# Patient Record
Sex: FEMALE | Race: BLACK OR AFRICAN AMERICAN | NOT HISPANIC OR LATINO | URBAN - METROPOLITAN AREA
[De-identification: names, ages, dates, MRNs, and addresses within clinical notes are randomized per-mention and may not be internally consistent; named-entity substitution may affect disease eponyms.]

---

## 2017-01-05 ENCOUNTER — OUTPATIENT (OUTPATIENT)
Dept: OUTPATIENT SERVICES | Facility: HOSPITAL | Age: 32
LOS: 1 days | Discharge: HOME | End: 2017-01-05
Payer: MEDICAID

## 2017-01-05 DIAGNOSIS — Z88.0 ALLERGY STATUS TO PENICILLIN: ICD-10-CM

## 2017-01-05 DIAGNOSIS — E66.9 OBESITY, UNSPECIFIED: ICD-10-CM

## 2017-01-05 DIAGNOSIS — N62 HYPERTROPHY OF BREAST: ICD-10-CM

## 2017-01-05 DIAGNOSIS — I10 ESSENTIAL (PRIMARY) HYPERTENSION: ICD-10-CM

## 2017-01-05 DIAGNOSIS — K21.9 GASTRO-ESOPHAGEAL REFLUX DISEASE WITHOUT ESOPHAGITIS: ICD-10-CM

## 2017-01-05 DIAGNOSIS — J45.909 UNSPECIFIED ASTHMA, UNCOMPLICATED: ICD-10-CM

## 2017-01-05 PROCEDURE — 19318 BREAST REDUCTION: CPT | Mod: AS

## 2017-01-12 LAB — SURGICAL PATHOLOGY STUDY: SIGNIFICANT CHANGE UP

## 2018-08-02 ENCOUNTER — EMERGENCY (EMERGENCY)
Facility: HOSPITAL | Age: 33
LOS: 1 days | Discharge: HOME | End: 2018-08-02
Attending: EMERGENCY MEDICINE | Admitting: EMERGENCY MEDICINE
Payer: MEDICARE

## 2018-08-02 VITALS
HEART RATE: 84 BPM | SYSTOLIC BLOOD PRESSURE: 134 MMHG | TEMPERATURE: 99 F | RESPIRATION RATE: 16 BRPM | DIASTOLIC BLOOD PRESSURE: 90 MMHG | OXYGEN SATURATION: 100 %

## 2018-08-02 VITALS
TEMPERATURE: 98 F | RESPIRATION RATE: 16 BRPM | DIASTOLIC BLOOD PRESSURE: 83 MMHG | HEART RATE: 74 BPM | SYSTOLIC BLOOD PRESSURE: 118 MMHG | OXYGEN SATURATION: 100 %

## 2018-08-02 DIAGNOSIS — Z90.49 ACQUIRED ABSENCE OF OTHER SPECIFIED PARTS OF DIGESTIVE TRACT: Chronic | ICD-10-CM

## 2018-08-02 DIAGNOSIS — Z98.890 OTHER SPECIFIED POSTPROCEDURAL STATES: Chronic | ICD-10-CM

## 2018-08-02 DIAGNOSIS — Z98.51 TUBAL LIGATION STATUS: Chronic | ICD-10-CM

## 2018-08-02 LAB
ALBUMIN SERPL ELPH-MCNC: 4.5 G/DL — SIGNIFICANT CHANGE UP (ref 3.3–5)
ALP SERPL-CCNC: 36 U/L — LOW (ref 40–120)
ALT FLD-CCNC: 117 U/L — HIGH (ref 4–33)
AST SERPL-CCNC: 73 U/L — HIGH (ref 4–32)
BASOPHILS # BLD AUTO: 0.01 K/UL — SIGNIFICANT CHANGE UP (ref 0–0.2)
BASOPHILS NFR BLD AUTO: 0.2 % — SIGNIFICANT CHANGE UP (ref 0–2)
BILIRUB SERPL-MCNC: 0.4 MG/DL — SIGNIFICANT CHANGE UP (ref 0.2–1.2)
BUN SERPL-MCNC: 9 MG/DL — SIGNIFICANT CHANGE UP (ref 7–23)
CALCIUM SERPL-MCNC: 9.4 MG/DL — SIGNIFICANT CHANGE UP (ref 8.4–10.5)
CHLORIDE SERPL-SCNC: 99 MMOL/L — SIGNIFICANT CHANGE UP (ref 98–107)
CLARITY CSF: CLEAR — SIGNIFICANT CHANGE UP
CO2 SERPL-SCNC: 23 MMOL/L — SIGNIFICANT CHANGE UP (ref 22–31)
COLOR CSF: COLORLESS — SIGNIFICANT CHANGE UP
CREAT SERPL-MCNC: 0.54 MG/DL — SIGNIFICANT CHANGE UP (ref 0.5–1.3)
EOSINOPHIL # BLD AUTO: 0.1 K/UL — SIGNIFICANT CHANGE UP (ref 0–0.5)
EOSINOPHIL NFR BLD AUTO: 2.3 % — SIGNIFICANT CHANGE UP (ref 0–6)
GLUCOSE CSF-MCNC: 60 MG/DL — SIGNIFICANT CHANGE UP (ref 40–70)
GLUCOSE SERPL-MCNC: 86 MG/DL — SIGNIFICANT CHANGE UP (ref 70–99)
GRAM STN CSF: SIGNIFICANT CHANGE UP
HCT VFR BLD CALC: 38.5 % — SIGNIFICANT CHANGE UP (ref 34.5–45)
HGB BLD-MCNC: 12.9 G/DL — SIGNIFICANT CHANGE UP (ref 11.5–15.5)
IMM GRANULOCYTES # BLD AUTO: 0.01 # — SIGNIFICANT CHANGE UP
IMM GRANULOCYTES NFR BLD AUTO: 0.2 % — SIGNIFICANT CHANGE UP (ref 0–1.5)
LYMPHOCYTES # BLD AUTO: 1.94 K/UL — SIGNIFICANT CHANGE UP (ref 1–3.3)
LYMPHOCYTES # BLD AUTO: 44.6 % — HIGH (ref 13–44)
LYMPHOCYTES # CSF: 60 % — SIGNIFICANT CHANGE UP
MCHC RBC-ENTMCNC: 29.5 PG — SIGNIFICANT CHANGE UP (ref 27–34)
MCHC RBC-ENTMCNC: 33.5 % — SIGNIFICANT CHANGE UP (ref 32–36)
MCV RBC AUTO: 88.1 FL — SIGNIFICANT CHANGE UP (ref 80–100)
MONOCYTES # BLD AUTO: 0.28 K/UL — SIGNIFICANT CHANGE UP (ref 0–0.9)
MONOCYTES # CSF: 40 % — SIGNIFICANT CHANGE UP
MONOCYTES NFR BLD AUTO: 6.4 % — SIGNIFICANT CHANGE UP (ref 2–14)
NEUTROPHILS # BLD AUTO: 2.01 K/UL — SIGNIFICANT CHANGE UP (ref 1.8–7.4)
NEUTROPHILS NFR BLD AUTO: 46.3 % — SIGNIFICANT CHANGE UP (ref 43–77)
NEUTS SEG NFR CSF MANUAL: 0 % — SIGNIFICANT CHANGE UP
NRBC # FLD: 0 — SIGNIFICANT CHANGE UP
NRBC NFR CSF: < 1 CELL/UL — SIGNIFICANT CHANGE UP (ref 0–5)
PLATELET # BLD AUTO: 244 K/UL — SIGNIFICANT CHANGE UP (ref 150–400)
PMV BLD: 9.6 FL — SIGNIFICANT CHANGE UP (ref 7–13)
POTASSIUM SERPL-MCNC: 5.2 MMOL/L — SIGNIFICANT CHANGE UP (ref 3.5–5.3)
POTASSIUM SERPL-SCNC: 5.2 MMOL/L — SIGNIFICANT CHANGE UP (ref 3.5–5.3)
PROT CSF-MCNC: 14.5 MG/DL — LOW (ref 15–45)
PROT SERPL-MCNC: 7.7 G/DL — SIGNIFICANT CHANGE UP (ref 6–8.3)
RBC # BLD: 4.37 M/UL — SIGNIFICANT CHANGE UP (ref 3.8–5.2)
RBC # CSF: < 1 CELL/UL — HIGH (ref 0–0)
RBC # FLD: 12.3 % — SIGNIFICANT CHANGE UP (ref 10.3–14.5)
SODIUM SERPL-SCNC: 136 MMOL/L — SIGNIFICANT CHANGE UP (ref 135–145)
SPECIMEN SOURCE: SIGNIFICANT CHANGE UP
TOTAL CELLS COUNTED, SPINAL FLUID: 10 CELLS — SIGNIFICANT CHANGE UP
WBC # BLD: 4.35 K/UL — SIGNIFICANT CHANGE UP (ref 3.8–10.5)
WBC # FLD AUTO: 4.35 K/UL — SIGNIFICANT CHANGE UP (ref 3.8–10.5)
XANTHOCHROMIA: SIGNIFICANT CHANGE UP

## 2018-08-02 PROCEDURE — 99285 EMERGENCY DEPT VISIT HI MDM: CPT | Mod: 25

## 2018-08-02 PROCEDURE — 62270 DX LMBR SPI PNXR: CPT

## 2018-08-02 PROCEDURE — 70450 CT HEAD/BRAIN W/O DYE: CPT | Mod: 26

## 2018-08-02 RX ORDER — ACETAZOLAMIDE 250 MG/1
1 TABLET ORAL
Qty: 28 | Refills: 0 | OUTPATIENT
Start: 2018-08-02 | End: 2018-08-15

## 2018-08-02 RX ORDER — ACETAMINOPHEN 500 MG
650 TABLET ORAL ONCE
Qty: 0 | Refills: 0 | Status: COMPLETED | OUTPATIENT
Start: 2018-08-02 | End: 2018-08-02

## 2018-08-02 RX ADMIN — Medication 650 MILLIGRAM(S): at 13:13

## 2018-08-02 NOTE — SBIRT NOTE. - NSSBIRTFULLSCREEN_GEN_A_ED_FT
Meeting with patient attempted however Full Screen Not Performed due to  Provider at bedside; health  will return by 1430 to complete SBIRT service

## 2018-08-02 NOTE — ED PROVIDER NOTE - CRANIAL NERVE AND PUPILLARY EXAM
tongue is midline sluggish pupillary reflexes B/L, peripheral vision by confrontation diminished, central vision intact. Ipsilateral EOM deficit noted with abduction B/L/cranial nerves 2-12 intact/PERIPHERAL VISION NOT INTACT/tongue is midline/central vision intact

## 2018-08-02 NOTE — ED PROVIDER NOTE - NEUROLOGICAL SENSATION
R sided facial sensory deficits compared to L sided facial sensation present and normal in 4 extremities

## 2018-08-02 NOTE — CONSULT NOTE ADULT - ASSESSMENT
32 y/o F w/ h/o PCOS, IICH presenting w/ headache and peripheral vision loss similar to prior episode of IICH,  consistent with pseudotumor cerebri/IICH.     She is s/p high volume LP in ED with improvement in her symptoms.     Recommendation:   - start Diamox 250 mg BID   - has appt w/ Neurologist on Monday 8/6, advised to ask for CMP to be measured    - advised to return immediately to ED for any worsening symptoms   - no need for Ophthalmology eval   - d/w Dr Mckeon

## 2018-08-02 NOTE — CONSULT NOTE ADULT - NSHPATTENDINGPLANDISCUSS_GEN_ALL_CORE
neurology resident as above and I agree with plan to restart diamox 250mg BID and outpatient neurology follow up next week.

## 2018-08-02 NOTE — ED ADULT NURSE NOTE - OBJECTIVE STATEMENT
Pt rcvd to room 8 from intake c/o HA x 2 weeks accompanied by nausea, peripheral vision loss, lightheadedness when standing, discomfort in vision with "bright light." Denies vomiting, fever, chills, weakness, or any other complaints at this time. Aox3, ambulatory at baseline. Awaiting MD lyman at this time. Will continue to monitor.

## 2018-08-02 NOTE — ED PROVIDER NOTE - PROGRESS NOTE DETAILS
IVANA note: 34yo female h/o pseudotumor prior therapeutic LPs, previously on diamox, stoppped 2 years ago, p/w 2 weeks worsening headaches, neck pain, dizziness, visual changes, feels similar to prior symptoms, unable to get appt with neurologist. n o fevers, no chills, no recent illness, not sudden onset  will get ct head r/o acute intracranial pathology, then diagnostic/therapeutic LP Pt tolerated lumbar puncture well, states she is feeling better, currently lying supine in bed in NAD, VSS, called Neuro and Ophtho for consults. Will continue to obs. Patient sitting in bed comfortably in no acute distress, vital signs are stable. Patient talking in full sentences, patient able to ambulate well in the emergency room with no assistance. Patient is stable to be discharged. Case discussed with Dr. Conner who agrees the patient can be discharged with outpatient follow-up with Neurology and PMD. Patient expresses understanding of the diagnosis and has been told to return to the emergency room if any fevers, chills, chest pain, shortness of breath, new or worsening symptoms. Patient expresses desire for discharge and agrees to follow-up as discussed.

## 2018-08-02 NOTE — ED PROCEDURE NOTE - ATTENDING CONTRIBUTION TO CARE
Opening pressure 35 with clear CSF. 25mL CSF removed and patient reports marked improvement of her symptoms.

## 2018-08-02 NOTE — ED PROVIDER NOTE - ATTENDING CONTRIBUTION TO CARE
34 y/o F PMHx pseudotumor cerebri (dx 2014, hx of therapeutic LPs, last 2015, was on Acetazolamide), PCOS, GERD, pre-DM p/w HA, blurry vision and peripheral vision is diminished, which feels exactly like how her pseduotumor feels like it. Endorses R facial sensory deficits compared to L side, No vertical/bidirectional nystagmus. Visual acuity intact but loss of peripheral fields. Plan- Labs, CTH, therapeutic L.P., neuro consult.

## 2018-08-02 NOTE — ED PROVIDER NOTE - OBJECTIVE STATEMENT
34yo F w/ pmhx of pseudotumor cerebri (diagnosed 2014, hx of LP for therapeutic purposes x 3, last 2015), PCOS, GERD, pre-DM - not on any medications presents to the ED c/o HA, blurred vision, visual changes, lightheadedness which have been worsening x 2 weeks. Pt states she had previously been on Acetazolamide for P.C. as maintenance medication; however, since her last pregnancy in 2015 she has not been on meds and managed her condition with weight reduction. Pt states she feels like her peripheral vision is diminished B/L, reports lightheadedness especially w/ bending down and flexion of her neck,  no episodes of syncope. Pt also reports B/L paresthesias of her arms and fingers which feels like "they are asleep." Pt reports nausea and vomiting since the onset of symptoms, not actively vomiting, mild nausea presently. Pt denies fevers, chills, CP, sob, abd pain, focal weakness, neck stiffness. 32yo F w/ pmhx of pseudotumor cerebri (diagnosed 2014, hx of LP for therapeutic purposes x 3, last 2015), PCOS, GERD, pre-DM - not on any medications presents to the ED c/o HA, blurred vision, visual changes, lightheadedness which have been worsening x 2 weeks. Pt states she had previously been on Acetazolamide for P.C. as maintenance medication; however, since her last pregnancy in 2015 she has not been on meds and managed her condition with weight reduction. Pt states she feels like her peripheral vision is diminished B/L, reports lightheadedness especially w/ bending down and flexion of her neck,  no episodes of syncope. Pt also reports B/L paresthesias of her arms and fingers which feels like "they are asleep." Pt reports nausea and vomiting since the onset of symptoms, not actively vomiting, mild nausea presently. Pt denies fevers, chills, CP, sob, abd pain, focal weakness, neck stiffness.  PMD= Dr. Tracey Gavin  Neuro/Ophthal= Dr. Angel Finney 32yo F w/ pmhx of pseudotumor cerebri (diagnosed 2014, hx of LP for therapeutic purposes x 3, last 2015), PCOS, GERD, pre-DM - not on any medications presents to the ED c/o HA, blurred vision, visual changes, lightheadedness which have been worsening x 2 weeks. Pt states she had previously been on Acetazolamide for P.C. as maintenance medication; however, since her last pregnancy in 2015 she has not been on meds and managed her condition with weight reduction. Pt states she feels like her peripheral vision is diminished B/L, reports lightheadedness especially w/ bending down and flexion of her neck,  no episodes of syncope. Pt also reports B/L paresthesias of her arms and fingers which feels like "they are asleep." Pt reports nausea and vomiting since the onset of symptoms, not actively vomiting, mild nausea presently. Pt denies fevers, chills, CP, sob, abd pain, focal weakness, neck stiffness.  PMD= Dr. Tracey Gavin  Neuro/Ophthal= Dr. Angel Finney- pt called to make appt but was unable to obtain until Dec.

## 2018-08-02 NOTE — CONSULT NOTE ADULT - SUBJECTIVE AND OBJECTIVE BOX
NEUROLOGY CONSULT     Patient is a 33y old  Female who presents with a chief complaint of headache, blurry vision     HPI: 32 y/o AA overweight F w/ h/o PCOS, IICH in past, previously on Diamox presenting w/ HA and blurry vision x2 weeks, acutely worsening the past 2 days. HA initially dull, pressure like, bothersome and intermittent, progressed to constant headache and pressure behind eyes, w/ peripheral darkening of vision.   Prior History of IICH: Diagnosed 2013, s/p therapeutic LPs x3, on Diamox stopped 2014 after symptoms stable. Was following w/ Neuro ophthalmologist at [facility].   Had therapeutic LP in ED- opening pressure 33. 30 cc drained. Feels symptoms have improved, in terms of headache which is less painful, less pressure behind eyes and vision slightly blurry but otherwise no peripheral vision loss.       PAST MEDICAL & SURGICAL HISTORY:  GERD (gastroesophageal reflux disease)  PCOS (polycystic ovarian syndrome)  Pseudotumor cerebri  History of cholecystectomy  History of loop recorder  S/P bilateral breast reduction  H/O tubal ligation      Allergies    MSG (Hives)  penicillins (Anaphylaxis)  Reglan (Other)    Intolerances        MEDICATIONS  (STANDING):    MEDICATIONS  (PRN):      Vital Signs Last 24 Hrs  T(C): 37 (02 Aug 2018 10:05), Max: 37 (02 Aug 2018 10:05)  T(F): 98.6 (02 Aug 2018 10:05), Max: 98.6 (02 Aug 2018 10:05)  HR: 70 (02 Aug 2018 11:14) (70 - 84)  BP: 121/82 (02 Aug 2018 11:14) (121/82 - 134/90)  BP(mean): --  RR: 16 (02 Aug 2018 11:14) (16 - 16)  SpO2: 100% (02 Aug 2018 11:14) (100% - 100%)    PHYSICAL EXAM:   General appearance: No acute distress                 Mental Status: AAOx3, fluent speech, follows simple commands, able to name  Cranial Nerves: EOMI, PERRL, VFF, no peripheral vision loss, visual acuity 20/20, unable to visualize fundi,  facial symmetric,  tongue midline  Motor: strength 5/5 throughout. No drift x4  Sensation: Intact to LT throughout  Coordination: FTN intact b/l  Gait: deferred due to LP     LABS:                          12.9   4.35  )-----------( 244      ( 02 Aug 2018 12:14 )             38.5     08-02    136  |  99  |  9   ----------------------------<  86  5.2   |  23  |  0.54    Ca    9.4      02 Aug 2018 12:14    TPro  7.7  /  Alb  4.5  /  TBili  0.4  /  DBili  x   /  AST  73<H>  /  ALT  117<H>  /  AlkPhos  36<L>  08-02      IMAGING:

## 2018-08-02 NOTE — ED ADULT NURSE NOTE - INTERVENTIONS DEFINITIONS
Instruct patient to call for assistance/[place] to call system/Stretcher in lowest position, wheels locked, appropriate side rails in place/Non-slip footwear when patient is off stretcher/Physically safe environment: no spills, clutter or unnecessary equipment/Call bell, personal items and telephone within reach

## 2018-08-02 NOTE — ED PROVIDER NOTE - PSH
H/O tubal ligation    History of cholecystectomy    History of loop recorder    S/P bilateral breast reduction

## 2018-08-02 NOTE — ED PROVIDER NOTE - CARE PLAN
Principal Discharge DX:	Headache  Assessment and plan of treatment:	Advance activity as tolerated.  Continue all previously prescribed medications as directed unless otherwise instructed.  Follow up with your primary care physician in 48-72 hours- bring copies of your results.  Return to the ER for worsening or persistent symptoms, and/or ANY NEW OR CONCERNING SYMPTOMS. If you have issues obtaining follow up, please call: [phone] to obtain a doctor or specialist who takes your insurance in your area.  You may call [phone] to make an appointment with the internal medicine clinic.

## 2018-08-02 NOTE — ED PROVIDER NOTE - PLAN OF CARE
Advance activity as tolerated.  Continue all previously prescribed medications as directed unless otherwise instructed.  Follow up with your primary care physician in 48-72 hours- bring copies of your results.  Return to the ER for worsening or persistent symptoms, and/or ANY NEW OR CONCERNING SYMPTOMS. If you have issues obtaining follow up, please call: [phone] to obtain a doctor or specialist who takes your insurance in your area.  You may call [phone] to make an appointment with the internal medicine clinic.

## 2018-08-02 NOTE — ED PROVIDER NOTE - MEDICAL DECISION MAKING DETAILS
A: 32yo F w/ hx of pseudotumor cerebri   -CT head  -CBC, CMP  -Lumbar puncture A: 34yo F w/ hx of pseudotumor cerebri   -CT head, CBC, CMP, Lumbar puncture  -Rx Diamox 250 mg BID   -pt has appt w/ Neurologist on Monday 8/6, advised to ask for CMP to be measured    - advised to return immediately to ED for any worsening symptoms- f/u with PMD regarding elevated liver function tests.

## 2018-08-08 LAB — BACTERIA CSF CULT: SIGNIFICANT CHANGE UP

## 2018-10-05 ENCOUNTER — APPOINTMENT (OUTPATIENT)
Dept: NEUROLOGY | Facility: CLINIC | Age: 33
End: 2018-10-05
Payer: MEDICARE

## 2018-10-05 VITALS
HEART RATE: 90 BPM | WEIGHT: 210 LBS | HEIGHT: 65 IN | DIASTOLIC BLOOD PRESSURE: 91 MMHG | BODY MASS INDEX: 34.99 KG/M2 | SYSTOLIC BLOOD PRESSURE: 132 MMHG

## 2018-10-05 DIAGNOSIS — G93.2 BENIGN INTRACRANIAL HYPERTENSION: ICD-10-CM

## 2018-10-05 PROBLEM — K21.9 GASTRO-ESOPHAGEAL REFLUX DISEASE WITHOUT ESOPHAGITIS: Chronic | Status: ACTIVE | Noted: 2018-08-02

## 2018-10-05 PROBLEM — E28.2 POLYCYSTIC OVARIAN SYNDROME: Chronic | Status: ACTIVE | Noted: 2018-08-02

## 2018-10-05 PROCEDURE — 99205 OFFICE O/P NEW HI 60 MIN: CPT

## 2019-03-21 ENCOUNTER — APPOINTMENT (OUTPATIENT)
Dept: NEUROLOGY | Facility: CLINIC | Age: 34
End: 2019-03-21

## 2019-06-10 ENCOUNTER — EMERGENCY (EMERGENCY)
Facility: HOSPITAL | Age: 34
LOS: 1 days | Discharge: HOME | End: 2019-06-10
Attending: EMERGENCY MEDICINE | Admitting: EMERGENCY MEDICINE
Payer: COMMERCIAL

## 2019-06-10 VITALS
HEART RATE: 72 BPM | DIASTOLIC BLOOD PRESSURE: 97 MMHG | SYSTOLIC BLOOD PRESSURE: 153 MMHG | RESPIRATION RATE: 16 BRPM | OXYGEN SATURATION: 100 % | TEMPERATURE: 98 F

## 2019-06-10 DIAGNOSIS — Z90.49 ACQUIRED ABSENCE OF OTHER SPECIFIED PARTS OF DIGESTIVE TRACT: Chronic | ICD-10-CM

## 2019-06-10 DIAGNOSIS — Z98.890 OTHER SPECIFIED POSTPROCEDURAL STATES: Chronic | ICD-10-CM

## 2019-06-10 DIAGNOSIS — Z98.51 TUBAL LIGATION STATUS: Chronic | ICD-10-CM

## 2019-06-10 PROCEDURE — 99283 EMERGENCY DEPT VISIT LOW MDM: CPT

## 2019-06-10 RX ORDER — MECLIZINE HCL 12.5 MG
1 TABLET ORAL
Qty: 12 | Refills: 0
Start: 2019-06-10 | End: 2019-06-13

## 2019-06-10 RX ORDER — KETOROLAC TROMETHAMINE 30 MG/ML
30 SYRINGE (ML) INJECTION ONCE
Refills: 0 | Status: DISCONTINUED | OUTPATIENT
Start: 2019-06-10 | End: 2019-06-10

## 2019-06-10 RX ADMIN — Medication 30 MILLIGRAM(S): at 10:33

## 2019-06-10 NOTE — ED PROVIDER NOTE - CLINICAL SUMMARY MEDICAL DECISION MAKING FREE TEXT BOX
34F head injury, low risk, some transient dizziness, ? vertigo, pt. states had severe vertigo in past. NSAID, rx meclizine prn worsening dizziness, head injury instructions for dc.
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
## 2019-06-10 NOTE — ED PROVIDER NOTE - CRANIAL NERVE AND PUPILLARY EXAM
tongue is midline/extra-ocular movements intact/CN 3-12 intact, mild R gaze nystagmus, short-lived, mild pos. [place] to R.

## 2019-12-26 ENCOUNTER — TRANSCRIPTION ENCOUNTER (OUTPATIENT)
Age: 34
End: 2019-12-26

## 2020-05-20 ENCOUNTER — TRANSCRIPTION ENCOUNTER (OUTPATIENT)
Age: 35
End: 2020-05-20

## 2021-02-24 NOTE — ED PROVIDER NOTE - CROS ED PSYCH ALL NEG
Patient's IV infiltrated during CT exam. IV was removed and a warm compress and elevation was applied. RN,Marcela , notified of infiltrate and given instructions to keep the patient's arm elevated and a warm compress on area. IV had blood return and flushed with no resistance prior to exam. IV was located in the left forearm. A new IV was placed in the patient right distal forearm. negative...

## 2021-03-17 ENCOUNTER — APPOINTMENT (OUTPATIENT)
Dept: NEUROLOGY | Facility: CLINIC | Age: 36
End: 2021-03-17

## 2022-11-29 ENCOUNTER — APPOINTMENT (OUTPATIENT)
Dept: OBGYN | Facility: CLINIC | Age: 37
End: 2022-11-29

## 2022-11-29 VITALS
DIASTOLIC BLOOD PRESSURE: 83 MMHG | WEIGHT: 173 LBS | SYSTOLIC BLOOD PRESSURE: 133 MMHG | BODY MASS INDEX: 28.82 KG/M2 | HEIGHT: 65 IN

## 2022-11-29 DIAGNOSIS — Z87.19 PERSONAL HISTORY OF OTHER DISEASES OF THE DIGESTIVE SYSTEM: ICD-10-CM

## 2022-11-29 DIAGNOSIS — Z78.9 OTHER SPECIFIED HEALTH STATUS: ICD-10-CM

## 2022-11-29 DIAGNOSIS — Z80.41 FAMILY HISTORY OF MALIGNANT NEOPLASM OF OVARY: ICD-10-CM

## 2022-11-29 DIAGNOSIS — Z80.0 FAMILY HISTORY OF MALIGNANT NEOPLASM OF DIGESTIVE ORGANS: ICD-10-CM

## 2022-11-29 DIAGNOSIS — Z80.3 FAMILY HISTORY OF MALIGNANT NEOPLASM OF BREAST: ICD-10-CM

## 2022-11-29 DIAGNOSIS — Z87.42 PERSONAL HISTORY OF OTHER DISEASES OF THE FEMALE GENITAL TRACT: ICD-10-CM

## 2022-11-29 DIAGNOSIS — Z87.2 PERSONAL HISTORY OF DISEASES OF THE SKIN AND SUBCUTANEOUS TISSUE: ICD-10-CM

## 2022-11-29 DIAGNOSIS — Z80.1 FAMILY HISTORY OF MALIGNANT NEOPLASM OF TRACHEA, BRONCHUS AND LUNG: ICD-10-CM

## 2022-11-29 DIAGNOSIS — F17.290 NICOTINE DEPENDENCE, OTHER TOBACCO PRODUCT, UNCOMPLICATED: ICD-10-CM

## 2022-11-29 DIAGNOSIS — Z01.419 ENCOUNTER FOR GYNECOLOGICAL EXAMINATION (GENERAL) (ROUTINE) W/OUT ABNORMAL FINDINGS: ICD-10-CM

## 2022-11-29 DIAGNOSIS — Z00.00 ENCOUNTER FOR GENERAL ADULT MEDICAL EXAMINATION W/OUT ABNORMAL FINDINGS: ICD-10-CM

## 2022-11-29 PROCEDURE — 99385 PREV VISIT NEW AGE 18-39: CPT

## 2022-11-29 RX ORDER — ACETAZOLAMIDE 250 MG/1
250 TABLET ORAL TWICE DAILY
Qty: 360 | Refills: 3 | Status: COMPLETED | COMMUNITY
Start: 2018-10-05 | End: 2022-11-29

## 2022-11-30 ENCOUNTER — NON-APPOINTMENT (OUTPATIENT)
Age: 37
End: 2022-11-30

## 2022-11-30 LAB
FSH SERPL-MCNC: 3.6 IU/L
HBV SURFACE AG SER QL: NONREACTIVE
HCV AB SER QL: NONREACTIVE
HCV S/CO RATIO: 0.15 S/CO
HIV1+2 AB SPEC QL IA.RAPID: NONREACTIVE
HPV HIGH+LOW RISK DNA PNL CVX: NOT DETECTED
T PALLIDUM AB SER QL IA: NEGATIVE

## 2022-12-01 LAB
C TRACH RRNA SPEC QL NAA+PROBE: NOT DETECTED
N GONORRHOEA RRNA SPEC QL NAA+PROBE: NOT DETECTED
SOURCE AMPLIFICATION: NORMAL

## 2022-12-09 LAB — CYTOLOGY CVX/VAG DOC THIN PREP: NORMAL

## 2022-12-30 ENCOUNTER — NON-APPOINTMENT (OUTPATIENT)
Age: 37
End: 2022-12-30

## 2022-12-30 ENCOUNTER — TRANSCRIPTION ENCOUNTER (OUTPATIENT)
Age: 37
End: 2022-12-30

## 2023-04-06 NOTE — ED PROVIDER NOTE - PHYSICAL EXAMINATION
Eyes: B/L horizontal nystagmus, EOM deficits noted w/ abduction and elevation of eyes B/L, sluggish pupillary contraction, deficits of visual fields by confrontation in all fields B/L Same Histology In Subsequent Stages Text: The pattern and morphology of the tumor is as described in the first stage.

## 2023-04-25 ENCOUNTER — ASOB RESULT (OUTPATIENT)
Age: 38
End: 2023-04-25

## 2023-04-25 ENCOUNTER — APPOINTMENT (OUTPATIENT)
Dept: MATERNAL FETAL MEDICINE | Facility: CLINIC | Age: 38
End: 2023-04-25
Payer: COMMERCIAL

## 2023-04-25 PROCEDURE — 99204 OFFICE O/P NEW MOD 45 MIN: CPT | Mod: 95

## 2023-05-31 DIAGNOSIS — Z31.83 ENCOUNTER FOR ASSISTED REPRODUCTIVE FERTILITY PROCEDURE CYCLE: ICD-10-CM

## 2023-05-31 DIAGNOSIS — Z87.09 PERSONAL HISTORY OF OTHER DISEASES OF THE RESPIRATORY SYSTEM: ICD-10-CM

## 2023-05-31 DIAGNOSIS — Z87.19 PERSONAL HISTORY OF OTHER DISEASES OF THE DIGESTIVE SYSTEM: ICD-10-CM

## 2023-05-31 DIAGNOSIS — Z86.79 PERSONAL HISTORY OF OTHER DISEASES OF THE CIRCULATORY SYSTEM: ICD-10-CM

## 2023-05-31 DIAGNOSIS — F32.A DEPRESSION, UNSPECIFIED: ICD-10-CM

## 2023-06-02 ENCOUNTER — NON-APPOINTMENT (OUTPATIENT)
Age: 38
End: 2023-06-02

## 2023-06-02 ENCOUNTER — APPOINTMENT (OUTPATIENT)
Dept: CARDIOLOGY | Facility: CLINIC | Age: 38
End: 2023-06-02
Payer: COMMERCIAL

## 2023-06-02 VITALS
WEIGHT: 179 LBS | HEART RATE: 90 BPM | OXYGEN SATURATION: 95 % | DIASTOLIC BLOOD PRESSURE: 80 MMHG | SYSTOLIC BLOOD PRESSURE: 120 MMHG | HEIGHT: 65 IN | BODY MASS INDEX: 29.82 KG/M2

## 2023-06-02 PROCEDURE — 93000 ELECTROCARDIOGRAM COMPLETE: CPT

## 2023-06-02 PROCEDURE — 99203 OFFICE O/P NEW LOW 30 MIN: CPT | Mod: 25

## 2023-07-27 NOTE — DISCUSSION/SUMMARY
[EKG obtained to assist in diagnosis and management of assessed problem(s)] : EKG obtained to assist in diagnosis and management of assessed problem(s) [FreeTextEntry1] : 38 year old woman  who is currently looking to initiate IVF\par BP is normal\par EKG normal\par CHeck TTE\par If TTE normal- no cardiac contraindication for IVF

## 2023-07-27 NOTE — HISTORY OF PRESENT ILLNESS
[FreeTextEntry1] : 38 year old woman  who is currently looking to initiate IVF\par \par - Delivered at term - no issues\par \par - Delivered at 35 weeks -->BP was high\par Diagnosed with PEC-->on Magnesium\par BP meds for a few weeks but was dc'd\par \par - Delivered at 35 weeks\par Also diagnosed with pseudotumor cerebri\par No meds given\par \par No meds or issues since that time\par Follows with PCP\par EKG normal

## 2023-07-27 NOTE — ADDENDUM
[FreeTextEntry1] : Patient's echocardiogram reviewed with normal cardiac function\par She is medically optimized from cardiac perspective to proceed with IVF

## 2023-09-26 ENCOUNTER — NON-APPOINTMENT (OUTPATIENT)
Age: 38
End: 2023-09-26

## 2023-09-26 ENCOUNTER — APPOINTMENT (OUTPATIENT)
Dept: CARDIOLOGY | Facility: CLINIC | Age: 38
End: 2023-09-26
Payer: COMMERCIAL

## 2023-09-26 VITALS
OXYGEN SATURATION: 100 % | RESPIRATION RATE: 16 BRPM | SYSTOLIC BLOOD PRESSURE: 119 MMHG | DIASTOLIC BLOOD PRESSURE: 81 MMHG | HEART RATE: 78 BPM

## 2023-09-26 PROCEDURE — 93000 ELECTROCARDIOGRAM COMPLETE: CPT

## 2023-09-26 PROCEDURE — 99214 OFFICE O/P EST MOD 30 MIN: CPT | Mod: 25

## 2023-10-23 ENCOUNTER — OUTPATIENT (OUTPATIENT)
Dept: OUTPATIENT SERVICES | Facility: HOSPITAL | Age: 38
LOS: 1 days | End: 2023-10-23

## 2023-10-23 ENCOUNTER — APPOINTMENT (OUTPATIENT)
Dept: CV DIAGNOSITCS | Facility: HOSPITAL | Age: 38
End: 2023-10-23
Payer: COMMERCIAL

## 2023-10-23 DIAGNOSIS — Z90.49 ACQUIRED ABSENCE OF OTHER SPECIFIED PARTS OF DIGESTIVE TRACT: Chronic | ICD-10-CM

## 2023-10-23 DIAGNOSIS — Z98.890 OTHER SPECIFIED POSTPROCEDURAL STATES: Chronic | ICD-10-CM

## 2023-10-23 DIAGNOSIS — Z87.59 PERSONAL HISTORY OF OTHER COMPLICATIONS OF PREGNANCY, CHILDBIRTH AND THE PUERPERIUM: ICD-10-CM

## 2023-10-23 DIAGNOSIS — I26.99 OTHER PULMONARY EMBOLISM WITHOUT ACUTE COR PULMONALE: ICD-10-CM

## 2023-10-23 PROCEDURE — 76377 3D RENDER W/INTRP POSTPROCES: CPT | Mod: 26

## 2023-10-23 PROCEDURE — 93306 TTE W/DOPPLER COMPLETE: CPT | Mod: 26

## 2023-11-27 ENCOUNTER — NON-APPOINTMENT (OUTPATIENT)
Age: 38
End: 2023-11-27

## 2023-11-27 ENCOUNTER — APPOINTMENT (OUTPATIENT)
Dept: CARDIOLOGY | Facility: CLINIC | Age: 38
End: 2023-11-27
Payer: COMMERCIAL

## 2023-11-27 VITALS
HEIGHT: 65 IN | WEIGHT: 179 LBS | SYSTOLIC BLOOD PRESSURE: 109 MMHG | OXYGEN SATURATION: 99 % | DIASTOLIC BLOOD PRESSURE: 76 MMHG | BODY MASS INDEX: 29.82 KG/M2 | HEART RATE: 89 BPM

## 2023-11-27 DIAGNOSIS — Z87.59 PERSONAL HISTORY OF OTHER COMPLICATIONS OF PREGNANCY, CHILDBIRTH AND THE PUERPERIUM: ICD-10-CM

## 2023-11-27 DIAGNOSIS — I26.99 OTHER PULMONARY EMBOLISM W/OUT ACUTE COR PULMONALE: ICD-10-CM

## 2023-11-27 PROCEDURE — 93000 ELECTROCARDIOGRAM COMPLETE: CPT

## 2023-11-27 PROCEDURE — 99214 OFFICE O/P EST MOD 30 MIN: CPT | Mod: 25
